# Patient Record
(demographics unavailable — no encounter records)

---

## 2024-10-25 NOTE — HISTORY OF PRESENT ILLNESS
[FreeTextEntry1] : 59y/o woman Here for initial visit No prior history of kidney stones until the present time No family history of kidney stones 4mm left lower pole stone seen on CT from November 2023 and CT from May 2024 She had severe flank pain in August 2024 and was seen and evaluated at her local hospital.  She was told that she had a kidney stone. Follow-up renal ultrasound in October 2024 did not show any stones.    CT images and report reviewed Ultrasound report reviewed

## 2024-10-25 NOTE — HISTORY OF PRESENT ILLNESS
[FreeTextEntry1] : 61y/o woman Here for initial visit No prior history of kidney stones until the present time No family history of kidney stones 4mm left lower pole stone seen on CT from November 2023 and CT from May 2024 She had severe flank pain in August 2024 and was seen and evaluated at her local hospital.  She was told that she had a kidney stone. Follow-up renal ultrasound in October 2024 did not show any stones.    CT images and report reviewed Ultrasound report reviewed

## 2024-10-25 NOTE — ASSESSMENT
[FreeTextEntry1] : Based on most recent ultrasound she appears to be stone free Diet modification reviewed at length- increasing fluids (primarily water), citrus is good, and decreasing/moderating salt, animal flesh protein, oxalate containing foods, and moderation of calcium intake (1000 mg/day is USRDA).  I reviewed with the patient the risks of metabolic stone disease given their underlying risk parameters (all of which include large stones, multiple stones, bilateral stones, family history, and young age), and the indications for 24 hour urine metabolic assessment.  We also discussed benefits of regular exercise and weight loss as independent risk reducers for stones.  Follow-up in 6 months Renal ultrasound prior to visit

## 2024-12-17 NOTE — PHYSICAL EXAM

## 2025-01-15 NOTE — PHYSICAL EXAM
[Midline] : trachea located in midline position [Laryngoscopy Performed] : laryngoscopy was performed, see procedure section for findings [Normal] : no rashes [FreeTextEntry1] : No hoarseness.  [de-identified] : Well-healed anterior neck scar midway between cricoid and clavicular heads. [de-identified] : Thyroid gland normal size, no palpable nodules.  [de-identified] : 2+ bilateral  [de-identified] : Carotid pulses 2+ bilateral.

## 2025-01-15 NOTE — PROCEDURE
[de-identified] : LARYNGOSCOPY EXAM:  Indication:  thyroid nodules -Verbal consent was obtained from patient prior to procedure. -Oxymetazoline 0.05% spray applied to the nasal cavities. Flexible laryngoscopy was performed via mouth and revealed the following:   -- Base of tongue was symmetric and not enlarged.   -- Vallecula was clear   -- Epiglottis, both aryepiglottic folds and both false vocal folds were normal   -- Arytenoids both without edema and erythema    -- True vocal folds were fully mobile and without lesions.    -- Post cricoid area was clear.   -- Interarytenoid edema was absent     -- No lesion seen in laryngopharynx The patient tolerated the procedure well.

## 2025-01-15 NOTE — HISTORY OF PRESENT ILLNESS
[de-identified] : I was pleased to evaluate Ms. HAROLDO VALENTIN, a 61-year-old female who was referred by Dr. DOMÍNGUEZ for hypercalcemia and primary hyperparathyroidism.    She is s/p parathyroidectomy for primary hyperparathyroidism in 2007 (she reports removal of one gland). Over the past 1-2 years, serum calcium has been rising again.  Calcium 11s, with . She has had a kidney stone.  Per , "4mm left lower pole stone seen on CT from November 2023 and CT from May 2024.  She had severe flank pain in August 2024 and was seen and evaluated at her local hospital. She was told that she had a kidney stone.  Follow-up renal ultrasound in October 2024 did not show any stones."  No prior kidney stone. No bone fracture Has bone pain in legs often but thought it might be from working out. Has nocturnal urinary frequency, about 7 times/night, over past year. No abdominal pain, constipation or fatigue Family history is Negative for hypercalcemia or hyperparathyroidism She has osteopenia. 4D CT scan was localizing for an enlarged parathyroid gland, but mediastinum was not imaged. She needed Valium for the CT due to severe claustrophobia. She works as a  for [employer].   4D CT SCAN OF THE NECK FOR PARATHYROID LOCALIZATION (10-) at [facility] - HISTORY: 60 years old Female has recurrent hyperparathyroidism. Parathyroidectomy 20 years ago with 1 gland removed. Calcium 11,  - TECHNIQUE: CT scan of the neck was performed before and after intravenous contrast with 0.6mm and 1.0 mm thick sections on a 64-detector CT scanner. The mediastinum is not included in the study (usually included in the protocol). Arterial and venous runs are performed at 30 seconds, 60 seconds and 90 seconds following the start of contrast infusion. Coronal and sagittal reformations are performed with 1.0 mm thick sections. - COMPARISON: None. - FINDINGS: 30 seconds run is a late arterial phase/early venous phase. This is still sufficiently early to expect near peak parathyroid enhancement. * PARATHYROID: Right lower parathyroid adenoma, measuring 9 x 7 x 5 mm (164 mg), lies just below the lateral aspect of the lower pole of right lobe of thyroid, just deep to the strap muscle, 4 mm anterolateral to the anterolateral wall of trachea, 2 mm medial to right carotid, 3.5 cm below inferior margin of cricoid cartilage and 2.6 cm above suprasternal notch. (Section  through 175). The adenoma exhibits moderate arterial phase enhancement ( 140 HU from precontrast density 30 HU) and washout in venous phase (90 HU). Carotid arterial phase density is 125 HU. This should be verified with ultrasound. - 2 mm medial to the right lower parathyroid adenoma, just below the right thyroid lobe, just deep to strap muscle, and just anterior to the trachea, there is an 8 x 6 x 6 mm nodule (150 mg) with mild enhancement (100 H in density) and slight venous washout (70 HU density) compatible with a slightly hyperemic lymph node. Conceivably could be a second lobe of the parathyroid adenoma with poor enhancement due to missing peak enhancement in the early venous phase. This should be further characterized with ultrasound - No other enlarged parathyroid is identified. * THYROID: Thyroid is slightly enlarged with a left pyramidal lobe and thick thyroid isthmus (9 mm). There is heterogeneous distribution of mildly diminished iodine stores. Right lobe measures 4.3 x 2.1 x 1.1 cm (5.2 cc) and left lobe measures 4.7 x 2.9 x 1.4 cm. (9.9 cc). At posterior lateral subcapsular aspect of mid to upper pole of left lobe there is an 8 x 9 x 5 mm hypodense nodule with slightly heterogeneous enhancement that is close to isodense with iodine rich parenchyma on precontrast run.. There is an 8 x 6 x 4 mm hypodense nodule in anterior medial mid to upper right lobe with slight iodine content. * NODES: No pathologic nodes are identified in the neck or superior mediastinum. * OTHER NECK SOFT TISSUES: Glottis is closed consistent with a breath hold. Soft tissues of the neck are otherwise unremarkable. * THYMIC BED/MEDIASTINUM: Not included in the field-of-view. * VISUALIZED LUNGS: No significant opacity is seen in the small amount of visualized lung parenchyma. * CARDIOVASCULAR: There is no aberrant right subclavian artery. * SKELETAL FINDINGS: Visualized bones have normal mineralization.. Cervical spondylosis is present. IMPRESSION: 1.) Right lower parathyroid adenoma lies just below the lateral aspect of lower pole of right thyroid lobe and just deep to strap muscle. The adenoma is 9 mm major axis with calculated weight ~165 mg. This should be verified on ultrasound performed by Harika at our [facility] office. 2.) 2 mm medial to right lower parathyroid adenoma, there is an 8 mm probable node. Correlate with ultrasound. 3.) The superior mediastinum is not included on the study-- a break in the protocol. Sestamibi scan with SPECT-CT might be performed to cover the mediastinum. 4.) Thyroid is slightly enlarged with a 9 mm iodine rich nodule in left upper pole and 8 mm nodule in the mid right lobe. Correlate with ultrasound. (Images were reviewed.)   DXA Scan (7-) at [facility] Impression: According to the WHO classification, the patient has low bone mass (osteopenia) in the left hip and left forearm.

## 2025-01-15 NOTE — REVIEW OF SYSTEMS
[Patient Intake Form Reviewed] : Patient intake form was reviewed [Recent Weight Gain (___ Lbs)] : recent [unfilled] ~Ulb weight gain [Heartburn] : heartburn [As Noted in HPI] : as noted in HPI [Vertigo] : vertigo [Negative] : Neurological

## 2025-01-15 NOTE — ASSESSMENT
[FreeTextEntry1] : Ms. HAROLDO VALENTIN, a 61-year-old female who has recurrent hypercalcemia and primary hyperparathyroidism.  Calcium 11s and .  She is s/p parathyroidectomy for primary hyperparathyroidism in 2007. She had kidney stone that passed in summer 2024.  She has osteopenia in left hip and left forearm. She has bone pain and marked nocturnal urinary frequency. 4D CT scan was localizing for an enlarged parathyroid gland, but mediastinum was not imaged.  We discussed surgery for removal of enlarged parathyroid gland(s), which can by asynchronous.  PLAN: - Sestamibi nuclear parathyroid scan with SPECT-CT to r/o mediastinal parathyroid location -  Valium 2 mg before CT due to severe claustrophobia.

## 2025-01-15 NOTE — CONSULT LETTER
[Dear  ___] : Dear  [unfilled], [( Thank you for referring [unfilled] for consultation for _____ )] : Thank you for referring [unfilled] for consultation for [unfilled] [Please see my note below.] : Please see my note below. [Consult Closing:] : Thank you very much for allowing me to participate in the care of this patient.  If you have any questions, please do not hesitate to contact me. [Sincerely,] : Sincerely, [FreeTextEntry2] : Danna Batres M.D. [address] [place] [address] FAX: [phone]   [FreeTextEntry3] : Estrellita Foster MD  Otolaryngology, Head and Neck Surgery   [facility], [address] [place] [address], [id number]

## 2025-04-18 NOTE — PHYSICAL EXAM
[Normal] : normal appearance, well groomed, well nourished, and in no acute distress [de-identified] : Well-healed anterior neck scar midway between cricoid and clavicular heads. [de-identified] : Thyroid gland normal size, no palpable nodules.

## 2025-04-18 NOTE — ASSESSMENT
[FreeTextEntry1] : Ms. VALENTIN is a 61-year-old female who has recurrent hypercalcemia and primary hyperparathyroidism.  Calcium 11s and .  S/p parathyroidectomy (one gland) for primary hyperparathyroidism in 2007. Kidney stone in summer 2024.  Osteopenia in left hip and left forearm. She has bone pain and nocturnal urinary frequency. 4D CT scan was localizing for an enlarged parathyroid gland near right thyroid lower pole, but mediastinum was not imaged. Recent nuclear parathyroid scan was also suggestive of abnormal uptake in same area on incomplete study.  She will eventually have parathyroidectomy surgery, but the newly diagnosed breast cancer treatment takes precedence.  She is to see her breast surgeon next week.  I told her that I will be leaving practice at [facility] at the end of this month.  She will f/up with Dr. Batres for calcium monitoring during the cancer treatment.

## 2025-04-18 NOTE — CONSULT LETTER
[Dear  ___] : Dear  [unfilled], [Courtesy Letter:] : I had the pleasure of seeing your patient, [unfilled], in my office today. [Please see my note below.] : Please see my note below. [Consult Closing:] : Thank you very much for allowing me to participate in the care of this patient.  If you have any questions, please do not hesitate to contact me. [Sincerely,] : Sincerely, [FreeTextEntry2] : Danna Batres M.D. [address] [place] [address] FAX: [phone]   [FreeTextEntry3] : Estrellita Foster MD  Otolaryngology, Head and Neck Surgery   [facility], [address] [place] [address], [id number]   [DrChristina  ___] : Dr. MAHER [___] : [unfilled]

## 2025-04-18 NOTE — HISTORY OF PRESENT ILLNESS
[de-identified] : INITIAL VISIT 01/15/2025 I was pleased to evaluate Ms. HAROLDO VALENTIN, a 61-year-old female who was referred by Dr. DOMÍNGUEZ for hypercalcemia and primary hyperparathyroidism.   She is s/p parathyroidectomy for primary hyperparathyroidism in 2007 (she reports removal of one gland). Over the past 1-2 years, serum calcium has been rising again.  Calcium 11s, with . She has had a kidney stone.  Per , "4mm left lower pole stone seen on CT from November 2023 and CT from May 2024.  She had severe flank pain in August 2024 and was seen and evaluated at her local hospital. She was told that she had a kidney stone.  Follow-up renal ultrasound in October 2024 did not show any stones."  No prior kidney stone. No bone fracture Has bone pain in legs often but thought it might be from working out. Has nocturnal urinary frequency, about 7 times/night, over past year. No abdominal pain, constipation or fatigue Family history is Negative for hypercalcemia or hyperparathyroidism She has osteopenia. 4D CT scan was localizing for an enlarged parathyroid gland, but mediastinum was not imaged. She needed Valium for the CT due to severe claustrophobia. She works as a  for [employer].  VISIT 4/18/2025 Ms. VALENTIN underwent nuclear parathyroid scan to make sure no parathyroid in her mediastinum (was not included in the CT imaging from 10/2024).  The nuclear scan was incomplete, and SPECT-CT was not done, because the sedative medication effect dissipated. No change in nocturnal urinary frequency or bone pain. 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 Recent core biopsy of a left axillary node showed metastatic carcinoma.  She has f/up with her surgeon Fabrice Tao at [facility] next week to discuss treatment recommendations.   NUCLEAR PARATHYROID IMAGING (3/03/2025) at [facility]  - Procedure: The patient was injected with approximately 20 mCi of technetium 99m labeled sestamibi.  Anterior neck and chest images were obtained beginning approximately 10 minutes after injection.  Additional images were obtained up to 2 hours. At this point in the study, the patient refused further imaging. - Early images show thyroid activity that is more prominent near the lower pole of the left lobe than in the rest of the thyroid, however no abnormal activity is appreciated. - At one hour and 1.5 hours after injection, there is a suggestion of a small amount of activity just inferior to the lower pole of the right lobe.  This activity was not seen on 2-hour imaging. IMPRESSION: Limited study.  The patient refused delayed and SPECT imaging.  However there is a questionable area of activity at the lower pole of the right lobe of the thyroid suggesting this might be the location of a parathyroid adenoma.  Confirmatory imaging is suggested. (Images were reviewed.)    4D CT SCAN OF THE NECK FOR PARATHYROID LOCALIZATION (10-) at [facility] - HISTORY: 60 years old Female has recurrent hyperparathyroidism. Parathyroidectomy 20 years ago with 1 gland removed. Calcium 11,  - TECHNIQUE: CT scan of the neck was performed before and after intravenous contrast with 0.6mm and 1.0 mm thick sections on a 64-detector CT scanner. The mediastinum is not included in the study (usually included in the protocol). Arterial and venous runs are performed at 30 seconds, 60 seconds and 90 seconds following the start of contrast infusion. Coronal and sagittal reformations are performed with 1.0 mm thick sections. - COMPARISON: None. - FINDINGS: 30 seconds run is a late arterial phase/early venous phase. This is still sufficiently early to expect near peak parathyroid enhancement. * PARATHYROID: Right lower parathyroid adenoma, measuring 9 x 7 x 5 mm (164 mg), lies just below the lateral aspect of the lower pole of right lobe of thyroid, just deep to the strap muscle, 4 mm anterolateral to the anterolateral wall of trachea, 2 mm medial to right carotid, 3.5 cm below inferior margin of cricoid cartilage and 2.6 cm above suprasternal notch. (Section  through 175). The adenoma exhibits moderate arterial phase enhancement ( 140 HU from precontrast density 30 HU) and washout in venous phase (90 HU). Carotid arterial phase density is 125 HU. This should be verified with ultrasound. - 2 mm medial to the right lower parathyroid adenoma, just below the right thyroid lobe, just deep to strap muscle, and just anterior to the trachea, there is an 8 x 6 x 6 mm nodule (150 mg) with mild enhancement (100 H in density) and slight venous washout (70 HU density) compatible with a slightly hyperemic lymph node. Conceivably could be a second lobe of the parathyroid adenoma with poor enhancement due to missing peak enhancement in the early venous phase. This should be further characterized with ultrasound - No other enlarged parathyroid is identified. * THYROID: Thyroid is slightly enlarged with a left pyramidal lobe and thick thyroid isthmus (9 mm). There is heterogeneous distribution of mildly diminished iodine stores. Right lobe measures 4.3 x 2.1 x 1.1 cm (5.2 cc) and left lobe measures 4.7 x 2.9 x 1.4 cm. (9.9 cc). At posterior lateral subcapsular aspect of mid to upper pole of left lobe there is an 8 x 9 x 5 mm hypodense nodule with slightly heterogeneous enhancement that is close to isodense with iodine rich parenchyma on precontrast run.. There is an 8 x 6 x 4 mm hypodense nodule in anterior medial mid to upper right lobe with slight iodine content. * NODES: No pathologic nodes are identified in the neck or superior mediastinum. * OTHER NECK SOFT TISSUES: Glottis is closed consistent with a breath hold. Soft tissues of the neck are otherwise unremarkable. * THYMIC BED/MEDIASTINUM: Not included in the field-of-view. * VISUALIZED LUNGS: No significant opacity is seen in the small amount of visualized lung parenchyma. * CARDIOVASCULAR: There is no aberrant right subclavian artery. * SKELETAL FINDINGS: Visualized bones have normal mineralization.. Cervical spondylosis is present. IMPRESSION: 1.) Right lower parathyroid adenoma lies just below the lateral aspect of lower pole of right thyroid lobe and just deep to strap muscle. The adenoma is 9 mm major axis with calculated weight ~165 mg. This should be verified on ultrasound performed by Harika at our [facility] office. 2.) 2 mm medial to right lower parathyroid adenoma, there is an 8 mm probable node. Correlate with ultrasound. 3.) The superior mediastinum is not included on the study-- a break in the protocol. Sestamibi scan with SPECT-CT might be performed to cover the mediastinum. 4.) Thyroid is slightly enlarged with a 9 mm iodine rich nodule in left upper pole and 8 mm nodule in the mid right lobe. Correlate with ultrasound. (Images were reviewed.)   DXA Scan (7-) at [facility] Impression: According to the WHO classification, the patient has low bone mass (osteopenia) in the left hip and left forearm.